# Patient Record
Sex: MALE | Race: WHITE
[De-identification: names, ages, dates, MRNs, and addresses within clinical notes are randomized per-mention and may not be internally consistent; named-entity substitution may affect disease eponyms.]

---

## 2019-10-24 ENCOUNTER — HOSPITAL ENCOUNTER (EMERGENCY)
Dept: HOSPITAL 97 - ER | Age: 39
Discharge: HOME | End: 2019-10-24
Payer: COMMERCIAL

## 2019-10-24 VITALS — DIASTOLIC BLOOD PRESSURE: 77 MMHG | OXYGEN SATURATION: 100 % | SYSTOLIC BLOOD PRESSURE: 127 MMHG | TEMPERATURE: 97.7 F

## 2019-10-24 DIAGNOSIS — N30.91: Primary | ICD-10-CM

## 2019-10-24 DIAGNOSIS — Z88.8: ICD-10-CM

## 2019-10-24 DIAGNOSIS — Z87.442: ICD-10-CM

## 2019-10-24 LAB
ALBUMIN SERPL BCP-MCNC: 3.8 G/DL (ref 3.4–5)
ALP SERPL-CCNC: 121 U/L (ref 45–117)
ALT SERPL W P-5'-P-CCNC: 42 U/L (ref 12–78)
AST SERPL W P-5'-P-CCNC: 27 U/L (ref 15–37)
BUN BLD-MCNC: 16 MG/DL (ref 7–18)
GLUCOSE SERPLBLD-MCNC: 97 MG/DL (ref 74–106)
HCT VFR BLD CALC: 46.6 % (ref 39.6–49)
LIPASE SERPL-CCNC: 145 U/L (ref 73–393)
LYMPHOCYTES # SPEC AUTO: 1.9 K/UL (ref 0.7–4.9)
PMV BLD: 9 FL (ref 7.6–11.3)
POTASSIUM SERPL-SCNC: 3.8 MMOL/L (ref 3.5–5.1)
RBC # BLD: 5.55 M/UL (ref 4.33–5.43)
UA COMPLETE W REFLEX CULTURE PNL UR: (no result)

## 2019-10-24 PROCEDURE — 87088 URINE BACTERIA CULTURE: CPT

## 2019-10-24 PROCEDURE — 74176 CT ABD & PELVIS W/O CONTRAST: CPT

## 2019-10-24 PROCEDURE — 87077 CULTURE AEROBIC IDENTIFY: CPT

## 2019-10-24 PROCEDURE — 81003 URINALYSIS AUTO W/O SCOPE: CPT

## 2019-10-24 PROCEDURE — 96361 HYDRATE IV INFUSION ADD-ON: CPT

## 2019-10-24 PROCEDURE — 96365 THER/PROPH/DIAG IV INF INIT: CPT

## 2019-10-24 PROCEDURE — 76377 3D RENDER W/INTRP POSTPROCES: CPT

## 2019-10-24 PROCEDURE — 83690 ASSAY OF LIPASE: CPT

## 2019-10-24 PROCEDURE — 36415 COLL VENOUS BLD VENIPUNCTURE: CPT

## 2019-10-24 PROCEDURE — 80076 HEPATIC FUNCTION PANEL: CPT

## 2019-10-24 PROCEDURE — 87186 SC STD MICRODIL/AGAR DIL: CPT

## 2019-10-24 PROCEDURE — 80048 BASIC METABOLIC PNL TOTAL CA: CPT

## 2019-10-24 PROCEDURE — 81015 MICROSCOPIC EXAM OF URINE: CPT

## 2019-10-24 PROCEDURE — 85025 COMPLETE CBC W/AUTO DIFF WBC: CPT

## 2019-10-24 PROCEDURE — 99283 EMERGENCY DEPT VISIT LOW MDM: CPT

## 2019-10-24 PROCEDURE — 87086 URINE CULTURE/COLONY COUNT: CPT

## 2019-10-24 NOTE — EDPHYS
Physician Documentation                                                                           

 [facility]                                                                 

Name: Augustin Jones                                                                                

Age: 38 yrs                                                                                       

Sex: Male                                                                                         

: 1980                                                                                   

MRN: [id number]                                                                                   

Arrival Date: 10/24/2019                                                                          

Time: 12:38                                                                                       

Account#: [id number]                                                                            

Bed 17                                                                                            

Private MD: Boone Lopez                                                                        

ED Physician Nirav Trujillo                                                                       

HPI:                                                                                              

10/24                                                                                             

13:03 This 38 yrs old  Male presents to ER via Ambulatory with complaints of Low     jmm 

      Back Pain, Blood In Urine.                                                                  

13:03 The patient presents with pain that is acute. Onset: The symptoms/episode               jmm 

      began/occurred acutely, 2 hour(s) ago. Modifying factors: The patient symptoms are          

      alleviated by nothing, the patient symptoms are aggravated by nothing. Associated signs     

      and symptoms: Pertinent positives: hematuria. This is a 38 year old male with a history     

      of crohns disease, SVT, that presents to the ED with complaints of left lower back pain     

      and hematuria beginning 2 hours ago. Patient states he is passing clots. Denies             

      abdominal pain. .                                                                           

                                                                                                  

Historical:                                                                                       

- Allergies:                                                                                      

12:45 PO contrast;                                                                            hb  

- Home Meds:                                                                                      

12:45 Dexilant Oral [Active]; metoprolol tartrate 50 mg Oral tab once daily [Active];         hb  

- PMHx:                                                                                           

12:45 chron's disease; fatty liver; SVT; C-diff;                                              hb  

12:47 Kidney stones;                                                                          hb  

- PSHx:                                                                                           

12:45 None;                                                                                   hb  

                                                                                                  

- Immunization history:: Adult Immunizations up to date.                                          

- Social history:: Smoking status: Patient/guardian denies using tobacco.                         

- Ebola Screening: : No symptoms or risks identified at this time.                                

                                                                                                  

                                                                                                  

ROS:                                                                                              

13:03 Constitutional: Negative for fever, chills, and weight loss, Cardiovascular: Negative   jmm 

      for chest pain, palpitations, and edema, Respiratory: Negative for shortness of breath,     

      cough, wheezing, and pleuritic chest pain, Abdomen/GI: Negative for abdominal pain,         

      nausea, vomiting, diarrhea, and constipation.                                               

13:03 Back: Positive for pain at rest.                                                            

13:03 : Positive for hematuria.                                                                 

13:03 All other systems are negative.                                                             

                                                                                                  

Exam:                                                                                             

13:03 Constitutional:  This is a well developed, well nourished patient who is awake, alert,  jmm 

      and in no acute distress. Head/Face:  atraumatic. Eyes:  EOMI, no conjunctival erythema     

      appreciated ENT:  Moist Mucus Membranes Neck:  Trachea midline, Supple Chest/axilla:        

      Normal chest wall appearance and motion.   Cardiovascular:  Regular rate and rhythm.        

      No edema appreciated Respiratory:  Normal respirations, no respiratory distress             

      appreciated Abdomen/GI:  Non distended, soft                                                

13:03 Skin:  General appearance color normal MS/ Extremity:  Moves all extremities, no            

      obvious deformities appreciated, no edema noted to the lower extremities  Neuro:  Awake     

      and alert, normal gait Psych:  Behavior is normal, Mood is normal, Patient is               

      cooperative and pleasant                                                                    

13:03 Back: CVA tenderness, is absent.                                                            

                                                                                                  

Vital Signs:                                                                                      

12:45  / 77; Pulse 98; Resp 16; Temp 97.7; Pulse Ox 100% on R/A; Weight 127.01 kg;      hb  

      Height 6 ft. 5 in. (195.58 cm); Pain 2/10;                                                  

12:45 Body Mass Index 33.20 (127.01 kg, 195.58 cm)                                            hb  

                                                                                                  

MDM:                                                                                              

12:57 Patient medically screened.                                                             [facility] 

15:10 Data reviewed: vital signs, nurses notes. Counseling: I had a detailed discussion with  regine 

      the patient and/or guardian regarding: the historical points, exam findings, and any        

      diagnostic results supporting the discharge/admit diagnosis, lab results, radiology         

      results, the need for outpatient follow up. ED course: Patient is alert and non toxic       

      in appearance in the ED. Patient is advised to follow up with urology due to hematuria.     

      Patient otherwise given strict return precautions. patient understood and agrees with       

      the plan of care. .                                                                         

                                                                                                  

10/24                                                                                             

12:56 Order name: Urine Dipstick--Ancillary (enter results); Complete Time: 14:             Health system 

10/24                                                                                             

13:03 Order name: Basic Metabolic Panel; Complete Time: 14:26                                 [facility] 

10/24                                                                                             

13:03 Order name: CBC with Diff; Complete Time: 14:26                                         [facility] 

10/24                                                                                             

13:03 Order name: Creatinine for Radiology; Complete Time: 14:26                              [facility] 

10/24                                                                                             

13:03 Order name: Hepatic Function; Complete Time: 14:26                                      [facility] 

10/24                                                                                             

13:03 Order name: Lipase; Complete Time: 14:26                                                [facility] 

10/24                                                                                             

13:03 Order name: IV Saline Lock; Complete Time: 13:48                                        [facility] 

10/24                                                                                             

13:03 Order name: Labs collected and sent; Complete Time: 13:48                               [facility] 

10/24                                                                                             

13:03 Order name: CT Stone Protocol; Complete Time: 13:37                                     [facility] 

10/24                                                                                             

13:06 Order name: Urine Dipstick-Ancillary (obtain specimen); Complete Time: 13:06            Health system 

10/24                                                                                             

14:26 Order name: Urine Microscopic Only                                                      [facility] 

10/24                                                                                             

14:26 Order name: Urine Culture                                                               [facility] 

                                                                                                  

Administered Medications:                                                                         

13:47 Drug: NS 0.9% 1000 ml Route: IV; Rate: 1 bolus; Site: right forearm;                    [facility] 

15:35 Follow up: IV Status: Completed infusion; IV Intake: 1000ml                             [facility] 

15:12 Drug: Rocephin 1 grams Route: IV; Rate: calculated rate; Site: right forearm;           [facility] 

15:35 Follow up: IV Status: Completed infusion; IV Intake: 10ml                               [facility] 

                                                                                                  

                                                                                                  

Disposition:                                                                                      

10/25                                                                                             

06:45 Co-signature as Attending Physician, Nirav Trujillo MD I agree with the assessment and   kdr 

      plan of care.                                                                               

                                                                                                  

Disposition:                                                                                      

10/24/19 15:15 Discharged to Home. Impression: Cystitis, unspecified with hematuria.              

- Condition is Stable.                                                                            

- Discharge Instructions: Hematuria, Adult.                                                       

- Prescriptions for Levaquin 750 mg Oral Tablet - take 1 tablet by ORAL route once                

  daily for 10 days; 10 tablet.                                                                   

- Medication Reconciliation Form, Thank You Letter, Antibiotic Education, Prescription            

  Opioid Use form.                                                                                

- Follow up: Macario Meyer MD; When: 2 - 3 days; Reason: Recheck today's complaints,           

  Continuance of care, Re-evaluation by your physician.                                           

                                                                                                  

                                                                                                  

                                                                                                  

Signatures:                                                                                       

Dispatcher [employer]                           Jie Smith RN RN   [id number]                                                  

Nirav Trujillo MD MD kdr Mickail, Joel, PA PA jmm Martinez, Eric                               [id number]                                                  

Oksana Humphrey, NILO                     RN   hb                                                   

                                                                                                  

Corrections: (The following items were deleted from the chart)                                    

10/24                                                                                             

12:47 12:45 Home Meds: Flomax Oral; hb                                                        hb  

12:47 12:45 Home Meds: Pentasa Oral; hb                                                       hb  

15:36 15:15 10/24/2019 15:15 Discharged to Home. Impression: Cystitis, unspecified with       aj1 

      hematuria. Condition is Stable. Forms are Medication Reconciliation Form, Thank You         

      Letter, Antibiotic Education, Prescription Opioid Use. Follow up: Macario Meyer; When:     

      2 - 3 days; Reason: Recheck today's complaints, Continuance of care, Re-evaluation by       

      your physician. regine                                                                         

                                                                                                  

**************************************************************************************************

## 2019-10-24 NOTE — ER
Nurse's Notes                                                                                     

 [facility]                                                                 

Name: Augustin Jones                                                                                

Age: 38 yrs                                                                                       

Sex: Male                                                                                         

: 1980                                                                                   

MRN: [id number]                                                                                   

Arrival Date: 10/24/2019                                                                          

Time: 12:38                                                                                       

Account#: [id number]                                                                            

Bed 17                                                                                            

Private MD: Boone Lopez                                                                        

Diagnosis: Cystitis, unspecified with hematuria                                                   

                                                                                                  

Presentation:                                                                                     

10/24                                                                                             

12:44 Presenting complaint: left flank pain x 2 days, burning with urination and blood in     hb  

      urine today. Transition of care: patient was not received from another setting of care.     

      Onset of symptoms was 2019. Risk Assessment: Do you want to hurt yourself       

      or someone else? Patient reports no desire to harm self or others. Initial Sepsis           

      Screen: Does the patient meet any 2 criteria? No. Patient's initial sepsis screen is        

      negative. Does the patient have a suspected source of infection? No. Patient's initial      

      sepsis screen is negative. Care prior to arrival: None.                                     

12:44 Method Of Arrival: Ambulatory                                                           hb  

12:44 Acuity: SHEA 3                                                                           hb  

                                                                                                  

Historical:                                                                                       

- Allergies:                                                                                      

12:45 PO contrast;                                                                            hb  

- Home Meds:                                                                                      

12:45 Dexilant Oral [Active]; metoprolol tartrate 50 mg Oral tab once daily [Active];         hb  

- PMHx:                                                                                           

12:45 chron's disease; fatty liver; SVT; C-diff;                                              hb  

12:47 Kidney stones;                                                                          hb  

- PSHx:                                                                                           

12:45 None;                                                                                   hb  

                                                                                                  

- Immunization history:: Adult Immunizations up to date.                                          

- Social history:: Smoking status: Patient/guardian denies using tobacco.                         

- Ebola Screening: : No symptoms or risks identified at this time.                                

                                                                                                  

                                                                                                  

Screenin:51 Abuse screen: Denies threats or abuse. Denies injuries from another. Nutritional        aj1 

      screening: No deficits noted. Tuberculosis screening: No symptoms or risk factors           

      identified.                                                                                 

15:36 Fall Risk None identified.                                                              aj1 

                                                                                                  

Assessment:                                                                                       

12:51 General: Appears uncomfortable, Behavior is calm, cooperative, appropriate for age.     aj1 

      Pain: Complains of pain in posterior aspect of left lateral abdomen. Neuro: Level of        

      Consciousness is awake, alert, obeys commands. Cardiovascular: Patient's skin is warm       

      and dry. Respiratory: Airway is patent Respiratory effort is even, unlabored,               

      Respiratory pattern is regular, symmetrical. GI: No signs and/or symptoms were reported     

      involving the gastrointestinal system. : Reports burning with urination, urinary          

      frequency, blood in urine. EENT: No signs and/or symptoms were reported regarding the       

      EENT system. Derm: No signs and/or symptoms reported regarding the dermatologic system.     

      Skin is pink, warm \T\ dry. normal. Musculoskeletal: No signs and/or symptoms reported      

      regarding the musculoskeletal system. Circulation, motion, and sensation intact.            

13:50 Reassessment: Patient appears in no apparent distress at this time. No changes from     aj1 

      previously documented assessment. Patient and/or family updated on plan of care and         

      expected duration. Pain level reassessed. Patient is alert, oriented x 3, equal             

      unlabored respirations, skin warm/dry/pink.                                                 

14:50 Reassessment: Patient appears in no apparent distress at this time. No changes from     aj1 

      previously documented assessment. Patient and/or family updated on plan of care and         

      expected duration. Pain level reassessed. Patient is alert, oriented x 3, equal             

      unlabored respirations, skin warm/dry/pink.                                                 

15:35 Reassessment: Patient appears in no apparent distress at this time. No changes from     aj1 

      previously documented assessment. Patient and/or family updated on plan of care and         

      expected duration. Pain level reassessed. Patient is alert, oriented x 3, equal             

      unlabored respirations, skin warm/dry/pink.                                                 

                                                                                                  

Vital Signs:                                                                                      

12:45  / 77; Pulse 98; Resp 16; Temp 97.7; Pulse Ox 100% on R/A; Weight 127.01 kg;      hb  

      Height 6 ft. 5 in. (195.58 cm); Pain 2/10;                                                  

12:45 Body Mass Index 33.20 (127.01 kg, 195.58 cm)                                              

                                                                                                  

ED Course:                                                                                        

12:38 Patient arrived in ED.                                                                  as  

12:39 Boone Lopez DO is Private Physician.                                                as  

12:44 Aman Moreno PA is PHCP.                                                              [facility] 

12:44 Nirav Trujillo MD is Attending Physician.                                              [facility] 

12:45 Triage completed.                                                                       hb  

12:45 Arm band placed on.                                                                     hb  

12:49 Jie Mitchell, RN is Primary Nurse.                                                   aj1 

12:51 Patient has correct armband on for positive identification. Bed in low position. Call   aj1 

      light in reach. Side rails up X 1.                                                          

12:51 No provider procedures requiring assistance completed.                                  aj1 

13:21 CT Stone Protocol In Process Unspecified.                                               [facility]

15:15 Macario Meyer MD is Referral Physician.                                              [facility] 

15:36 IV discontinued, intact, bleeding controlled, No redness/swelling at site. Pressure     aj1 

      dressing applied.                                                                           

                                                                                                  

Administered Medications:                                                                         

13:47 Drug: NS 0.9% 1000 ml Route: IV; Rate: 1 bolus; Site: right forearm;                    aj1 

15:35 Follow up: IV Status: Completed infusion; IV Intake: 1000ml                             aj1 

15:12 Drug: Rocephin 1 grams Route: IV; Rate: calculated rate; Site: right forearm;           aj1 

15:35 Follow up: IV Status: Completed infusion; IV Intake: 10ml                               aj1 

                                                                                                  

                                                                                                  

Intake:                                                                                           

15:35 IV: 1000ml; Total: 1000ml.                                                              aj1 

15:35 IV: 10ml; Total: 1010ml.                                                                aj1 

                                                                                                  

Outcome:                                                                                          

15:15 Discharge ordered by MD.                                                                [facility] 

15:36 Discharged to home ambulatory.                                                          aj1 

15:36 Condition: good                                                                             

15:36 Discharge instructions given to patient, Instructed on discharge instructions, follow       

      up and referral plans. medication usage, Demonstrated understanding of instructions,        

      follow-up care, medications, Prescriptions given X 1.                                       

15:36 Patient left the ED.                                                                    aj1 

                                                                                                  

Signatures:                                                                                       

Dispatcher [employer]                           Jie Smith RN                     RN   aj1                                                  

Aman Moreno PA PA jmm Martinez, Amelia as Baxter, Heather, NILO                     RN   hb                                                   

                                                                                                  

Corrections: (The following items were deleted from the chart)                                    

12:47 12:45 Home Meds: Flomax Oral; hb                                                        hb  

12:47 12:45 Home Meds: Pentasa Oral; hb                                                       hb  

                                                                                                  

**************************************************************************************************

## 2019-10-24 NOTE — RAD REPORT
EXAM DESCRIPTION:  CT - Stone Protocol - 10/24/2019 1:21 pm

 

CLINICAL HISTORY:  Left flank pain, dysuria

 

COMPARISON:  September 2016

 

TECHNIQUE:  Axial 5 mm thick images were obtained without oral or IV contrast. The field-of-view span
s the entirety of the  system including uppermost abdomen and lung bases.

 

All CT scans are performed using dose optimization technique as appropriate and may include automated
 exposure control or mA/KV adjustment according to patient size.

 

FINDINGS:  No hydronephrosis is present and no obstructing ureteral calculi. No nonobstructing calcul
i in either kidney. No suspicious renal masses. Isodense masses and pyelonephritis are not excluded o
n a stone protocol CT scan. Urinary bladder is fully contracted limiting assessment. No bladder calcu
li seen. No significant adrenal finding. No prostate gland or seminal vesicle suspicious finding. Phl
eboliths seen the right-side pelvic floor.

 

Imaged portions of the liver, spleen and pancreas show no suspicious findings on non-contrast imaging
. No gallbladder or biliary tree abnormality identified.

 

No suspicious bowel findings.

 

No hernia, mass or bulky lymphadenopathy noted. No free air, free fluid or inflammatory stranding.

 

No significant bony abnormality.

 

IMPRESSION:  No hydronephrosis, obstructing calculus or other acute  finding. Bladder is fully cont
racted limiting assessment.

 

Isodense masses and pyelonephritis are not excluded on stone protocol technique.

 

Remainder the examination is also without an acute or significant finding.